# Patient Record
Sex: FEMALE | Race: WHITE
[De-identification: names, ages, dates, MRNs, and addresses within clinical notes are randomized per-mention and may not be internally consistent; named-entity substitution may affect disease eponyms.]

---

## 2017-05-26 ENCOUNTER — HEALTH MAINTENANCE LETTER (OUTPATIENT)
Age: 47
End: 2017-05-26

## 2019-09-27 ENCOUNTER — HEALTH MAINTENANCE LETTER (OUTPATIENT)
Age: 49
End: 2019-09-27

## 2022-02-24 ENCOUNTER — OFFICE VISIT (OUTPATIENT)
Dept: URGENT CARE | Facility: URGENT CARE | Age: 52
End: 2022-02-24
Payer: COMMERCIAL

## 2022-02-24 VITALS
HEART RATE: 63 BPM | BODY MASS INDEX: 23.03 KG/M2 | TEMPERATURE: 97.6 F | HEIGHT: 63 IN | OXYGEN SATURATION: 100 % | SYSTOLIC BLOOD PRESSURE: 128 MMHG | DIASTOLIC BLOOD PRESSURE: 74 MMHG

## 2022-02-24 DIAGNOSIS — H57.11 PAIN OF RIGHT EYE: Primary | ICD-10-CM

## 2022-02-24 PROCEDURE — 99203 OFFICE O/P NEW LOW 30 MIN: CPT | Performed by: PHYSICIAN ASSISTANT

## 2022-02-24 ASSESSMENT — ENCOUNTER SYMPTOMS
PALPITATIONS: 0
LIGHT-HEADEDNESS: 0
COUGH: 0
JOINT SWELLING: 0
RESPIRATORY NEGATIVE: 1
SORE THROAT: 0
ENDOCRINE NEGATIVE: 1
SHORTNESS OF BREATH: 0
MUSCULOSKELETAL NEGATIVE: 1
EYE ITCHING: 0
BACK PAIN: 0
WEAKNESS: 0
CHILLS: 0
ARTHRALGIAS: 0
PHOTOPHOBIA: 0
VOMITING: 0
HEADACHES: 0
NAUSEA: 0
NECK STIFFNESS: 0
NECK PAIN: 0
DIZZINESS: 0
MYALGIAS: 0
WOUND: 0
DIARRHEA: 0
ALLERGIC/IMMUNOLOGIC NEGATIVE: 1
EYE DISCHARGE: 0
EYE REDNESS: 0
CARDIOVASCULAR NEGATIVE: 1
FEVER: 0
EYE PAIN: 1
RHINORRHEA: 0

## 2022-02-25 NOTE — PROGRESS NOTES
Chief Complaint:      Chief Complaint   Patient presents with     Eye Pain     Right eye pain       Medical Decision Making:    Differential Diagnosis:  Eye Problem: eye trauma, intracranial bleed, eye drop over use.       ASSESSMENT     1. Pain of right eye         PLAN    R eye pupil is dilated and significantly larger than the L and is sluggish to light.   Unclear etiology at this time.   Patient instructed to go to the ED now for further evaluation, and possible opthamology consult.  Patient verbalized understanding and agreed with this plan.  Patient discharged in stable condition.    Labs:    No results found for any visits on 02/24/22.       Current Meds    Current Outpatient Medications:      ALPRAZolam (XANAX) 0.5 MG tablet, TAKE 1 TABLET BY MOUTH THREE TIMES A DAY AS NEEDED FOR ANXIETY I will authorize one refill - patient should be seen for further refills., Disp: 30 tablet, Rfl: 0     celecoxib (CELEBREX) 100 MG capsule, Take 1 capsule (100 mg) by mouth 2 times daily as needed for pain, Disp: 60 capsule, Rfl: 5     cyclobenzaprine (FLEXERIL) 10 MG tablet, Take 0.5-1 tablets (5-10 mg) by mouth 3 times daily as needed for muscle spasms, Disp: 30 tablet, Rfl: 1     FLUoxetine 20 MG tablet, TAKE 2 TABLETS (40 MG) BY MOUTH DAILY, Disp: 60 tablet, Rfl: 5     norgestimate-ethinyl estradiol (SPRINTEC 28) 0.25-35 MG-MCG tablet, Take 1 tablet by mouth daily, Disp: 28 tablet, Rfl: 2     omeprazole (PRILOSEC) 40 MG capsule, Take 1 capsule (40 mg) by mouth daily Take 30-60 minutes before a meal., Disp: 30 capsule, Rfl: 1     traZODone (DESYREL) 50 MG tablet, Take 1 tablet (50 mg) by mouth At Bedtime Needs to be seen for any further refill., Disp: 90 tablet, Rfl: 0     zolpidem (AMBIEN) 10 MG tablet, TAKE 1 TABLET BY MOUTH AT NIGHT AS NEEDED FOR SLEEP Needs to be seen for any further refill., Disp: 30 tablet, Rfl: 0    Allergies  Allergies   Allergen Reactions     Amoxicillin Rash     Vicodin  [Hydrocodone-Acetaminophen] Hives         SUBJECTIVE    HPI: Tiffany Vega is a 51 year old female presenting with right eye pain.  Patient states that she was sitting on the cough tonight when the pain started.  She thought that she may have scratched the eyes and used OTC drops with no relief. Her R eye is dilated completely in clinic today.     Patient is new to [facility].    ROS:     Review of Systems   Constitutional: Negative for chills and fever.   HENT: Negative for congestion, ear pain, rhinorrhea and sore throat.    Eyes: Positive for pain. Negative for photophobia, discharge, redness, itching and visual disturbance.   Respiratory: Negative.  Negative for cough and shortness of breath.    Cardiovascular: Negative.  Negative for chest pain and palpitations.   Gastrointestinal: Negative for diarrhea, nausea and vomiting.   Endocrine: Negative.    Genitourinary: Negative.    Musculoskeletal: Negative.  Negative for arthralgias, back pain, joint swelling, myalgias, neck pain and neck stiffness.   Skin: Negative.  Negative for rash and wound.   Allergic/Immunologic: Negative.  Negative for immunocompromised state.   Neurological: Negative for dizziness, weakness, light-headedness and headaches.           Family History   Family History   Problem Relation Age of Onset     Family History Negative Unknown      Cancer Son         Problem history  Patient Active Problem List   Diagnosis     PMDD (premenstrual dysphoric disorder)     CARDIOVASCULAR SCREENING; LDL GOAL LESS THAN 160     Major depression in partial remission (H)     Insomnia     Depressive disorder, not elsewhere classified     Viral gastroenteritis     LPRD (laryngopharyngeal reflux disease)           Social History  Social History     Socioeconomic History     Marital status:      Spouse name: Not on file     Number of children: Not on file     Years of education: Not on file     Highest education level: Not on file   Occupational  "History     Not on file   Tobacco Use     Smoking status: Never Smoker     Smokeless tobacco: Never Used   Substance and Sexual Activity     Alcohol use: Yes     Alcohol/week: 0.0 - 0.8 standard drinks     Drug use: No     Sexual activity: Yes     Partners: Male     Birth control/protection: Pill   Other Topics Concern     Parent/sibling w/ CABG, MI or angioplasty before 65F 55M? Not Asked      Service No     Blood Transfusions No     Caffeine Concern No     Occupational Exposure No     Hobby Hazards No     Sleep Concern No     Stress Concern No     Weight Concern No     Special Diet No     Back Care No     Exercise Yes     Bike Helmet No     Seat Belt Yes     Self-Exams Yes   Social History Narrative     Not on file     Social Determinants of Health     Financial Resource Strain: Not on file   Food Insecurity: Not on file   Transportation Needs: Not on file   Physical Activity: Not on file   Stress: Not on file   Social Connections: Not on file   Intimate Partner Violence: Not on file   Housing Stability: Not on file        OBJECTIVE     Vital signs reviewed by Pablo Hernández PA-C  /74 (BP Location: Left arm, Patient Position: Sitting, Cuff Size: Adult Regular)   Pulse 63   Temp 97.6  F (36.4  C) (Tympanic)   Ht 1.6 m (5' 3\")   SpO2 100%   BMI 23.03 kg/m       Physical Exam  Vitals and nursing note reviewed.   Constitutional:       General: She is not in acute distress.     Appearance: She is well-developed. She is not ill-appearing, toxic-appearing or diaphoretic.   HENT:      Head: Normocephalic and atraumatic.      Right Ear: Tympanic membrane and external ear normal. No drainage, swelling or tenderness. Tympanic membrane is not perforated, erythematous, retracted or bulging.      Left Ear: Tympanic membrane and external ear normal. No drainage, swelling or tenderness. Tympanic membrane is not perforated, erythematous, retracted or bulging.      Nose: No mucosal edema, congestion or " rhinorrhea.      Right Sinus: No maxillary sinus tenderness or frontal sinus tenderness.      Left Sinus: No maxillary sinus tenderness or frontal sinus tenderness.      Mouth/Throat:      Pharynx: No pharyngeal swelling, oropharyngeal exudate, posterior oropharyngeal erythema or uvula swelling.      Tonsils: No tonsillar abscesses.   Eyes:      General:         Right eye: No foreign body, discharge or hordeolum.      Conjunctiva/sclera:      Right eye: Right conjunctiva is not injected. No chemosis, exudate or hemorrhage.     Pupils: Pupils are equal, round, and reactive to light.      Right eye: Pupil is sluggish.      Comments: R pupil is dilated and significantly larger than the L pupil.   Neck:      Trachea: Trachea normal.   Cardiovascular:      Rate and Rhythm: Normal rate and regular rhythm.      Heart sounds: Normal heart sounds, S1 normal and S2 normal. No murmur heard.    No friction rub. No gallop.   Pulmonary:      Effort: Pulmonary effort is normal. No respiratory distress.      Breath sounds: Normal breath sounds. No decreased breath sounds, wheezing, rhonchi or rales.   Abdominal:      General: Bowel sounds are normal. There is no distension.      Palpations: Abdomen is soft. Abdomen is not rigid. There is no mass.      Tenderness: There is no abdominal tenderness. There is no guarding or rebound.   Musculoskeletal:      Cervical back: Full passive range of motion without pain, normal range of motion and neck supple.   Lymphadenopathy:      Cervical: No cervical adenopathy.   Skin:     General: Skin is warm and dry.   Neurological:      Mental Status: She is alert and oriented to person, place, and time.      Cranial Nerves: No cranial nerve deficit.      Deep Tendon Reflexes: Reflexes are normal and symmetric.   Psychiatric:         Behavior: Behavior normal. Behavior is cooperative.         Thought Content: Thought content normal.         Judgment: Judgment normal.            Pablo Hernández PA-C   2/24/2022, 7:58 PM